# Patient Record
Sex: FEMALE | URBAN - METROPOLITAN AREA
[De-identification: names, ages, dates, MRNs, and addresses within clinical notes are randomized per-mention and may not be internally consistent; named-entity substitution may affect disease eponyms.]

---

## 2018-12-04 ENCOUNTER — OFFICE VISIT (OUTPATIENT)
Dept: URBAN - METROPOLITAN AREA CLINIC 33 | Facility: CLINIC | Age: 73
End: 2018-12-04
Payer: MEDICARE

## 2018-12-04 DIAGNOSIS — H18.51 FUCHS' DYSTROPHY: ICD-10-CM

## 2018-12-04 DIAGNOSIS — H18.232 SECONDARY CORNEAL EDEMA, LEFT EYE: Primary | ICD-10-CM

## 2018-12-04 PROCEDURE — 99204 OFFICE O/P NEW MOD 45 MIN: CPT | Performed by: OPHTHALMOLOGY

## 2018-12-04 ASSESSMENT — INTRAOCULAR PRESSURE
OD: 15
OS: 16

## 2018-12-04 NOTE — IMPRESSION/PLAN
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 Condition: quality of life issue. Symptoms: will treat with meds. Vision: vision affected. Plan: Discussed diagnosis in detail with patient. Discussed treatment options with patient. Continue Tracey 128 and Restasis BID OU. Discussed possible need for DSEK OS in the future if condition does not improve with gtts. Will observe. Call if [place] worsens.

## 2019-01-18 ENCOUNTER — OFFICE VISIT (OUTPATIENT)
Dept: URBAN - METROPOLITAN AREA CLINIC 33 | Facility: CLINIC | Age: 74
End: 2019-01-18
Payer: MEDICARE

## 2019-01-18 PROCEDURE — 99213 OFFICE O/P EST LOW 20 MIN: CPT | Performed by: OPHTHALMOLOGY

## 2019-01-18 ASSESSMENT — INTRAOCULAR PRESSURE
OS: 17
OD: 17

## 2019-01-18 NOTE — IMPRESSION/PLAN
Impression: Secondary corneal edema, left eye: B68.087. Condition: resolved. Symptoms: will continue to monitor. Plan: Discussed diagnosis in detail with patient. Discussed treatment options with patient. Continue Tracey 128 and Restasis BID OU. Will continue to observe. DSEK not recommended at this time. Call if [address] worsens.

## 2019-02-19 ENCOUNTER — OFFICE VISIT (OUTPATIENT)
Dept: URBAN - METROPOLITAN AREA CLINIC 33 | Facility: CLINIC | Age: 74
End: 2019-02-19
Payer: MEDICARE

## 2019-02-19 DIAGNOSIS — H43.812 VITREOUS DEGENERATION, LEFT EYE: ICD-10-CM

## 2019-02-19 PROCEDURE — 99213 OFFICE O/P EST LOW 20 MIN: CPT | Performed by: OPHTHALMOLOGY

## 2019-02-19 ASSESSMENT — INTRAOCULAR PRESSURE
OS: 14
OD: 13

## 2019-02-19 NOTE — IMPRESSION/PLAN
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 Condition: established, stable. Symptoms: will continue to monitor. Plan: Discussed diagnosis in detail with patient. No treatment is required at this time. Continue Restasis BID OU and Tracey 128 prn. Discussed risks of progression. Will continue to observe condition and or symptoms. Call if [address] worsens.

## 2019-02-19 NOTE — IMPRESSION/PLAN
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 Plan: Discussed diagnosis in detail with patient. No treatment is required at this time. Discussed signs and symptoms of PVD/floaters. Will continue to observe condition and or symptoms. Call if [address] worsens.

## 2019-10-28 ENCOUNTER — OFFICE VISIT (OUTPATIENT)
Dept: URBAN - METROPOLITAN AREA CLINIC 33 | Facility: CLINIC | Age: 74
End: 2019-10-28
Payer: MEDICARE

## 2019-10-28 PROCEDURE — 92012 INTRM OPH EXAM EST PATIENT: CPT | Performed by: OPTOMETRIST

## 2019-10-28 ASSESSMENT — KERATOMETRY
OS: 44.25
OD: 44.25

## 2019-10-28 ASSESSMENT — VISUAL ACUITY
OD: 20/25
OS: 20/25

## 2019-12-09 ENCOUNTER — TESTING ONLY (OUTPATIENT)
Dept: URBAN - METROPOLITAN AREA CLINIC 33 | Facility: CLINIC | Age: 74
End: 2019-12-09

## 2019-12-09 DIAGNOSIS — H52.4 PRESBYOPIA: Primary | ICD-10-CM

## 2019-12-09 PROCEDURE — V2799 MISC VISION ITEM OR SERVICE: HCPCS | Performed by: OPTOMETRIST

## 2019-12-09 RX ORDER — CYCLOSPORINE 0.5 MG/ML
0.05 % EMULSION OPHTHALMIC
Qty: 90 | Refills: 6 | Status: ACTIVE
Start: 2019-12-09

## 2019-12-09 ASSESSMENT — VISUAL ACUITY
OS: 20/25
OD: 20/25

## 2019-12-09 NOTE — IMPRESSION/PLAN
Impression: Presbyopia: H52.4. Plan: No change to prescription. Discussed corneal swelling and dry eyes accounts for the patients blurry vision. Patient has discontinued Tracey, advised patient to restart Tracey 128  TID OU. Patient has MS and states her Buzz Neely has been impacted (color). *no rx change- discussed waiting to fill MRX until conditions are stable Restart Tracey 128 TID OU Continue artificial tears RXd Restasis BID OU 

RTC in 6-8 weeks for dry eye f/u